# Patient Record
Sex: FEMALE | Race: WHITE | NOT HISPANIC OR LATINO | Employment: UNEMPLOYED | URBAN - METROPOLITAN AREA
[De-identification: names, ages, dates, MRNs, and addresses within clinical notes are randomized per-mention and may not be internally consistent; named-entity substitution may affect disease eponyms.]

---

## 2020-01-23 PROBLEM — N87.0 CIN I (CERVICAL INTRAEPITHELIAL NEOPLASIA I): Status: ACTIVE | Noted: 2019-04-02

## 2020-01-23 PROBLEM — Z97.5 IUD (INTRAUTERINE DEVICE) IN PLACE: Status: ACTIVE | Noted: 2019-06-25

## 2020-01-23 PROBLEM — R10.2 PELVIC PAIN IN FEMALE: Status: ACTIVE | Noted: 2019-06-25

## 2020-01-23 PROBLEM — R87.613 HSIL ON PAP SMEAR OF CERVIX: Status: ACTIVE | Noted: 2018-04-16

## 2020-01-23 PROBLEM — Z98.890 STATUS POST CONIZATION OF CERVIX: Status: ACTIVE | Noted: 2018-06-22

## 2020-01-23 PROBLEM — N93.9 ABNORMAL UTERINE BLEEDING: Status: ACTIVE | Noted: 2018-04-10

## 2020-01-23 PROBLEM — R59.0 ANTERIOR CERVICAL ADENOPATHY: Status: ACTIVE | Noted: 2020-01-23

## 2020-03-10 PROBLEM — R50.9 FEVER: Status: ACTIVE | Noted: 2020-03-10

## 2020-03-10 PROBLEM — N87.9 CERVICAL INTRAEPITHELIAL NEOPLASIA (CIN): Status: ACTIVE | Noted: 2019-04-02

## 2020-03-10 PROBLEM — D61.818 PANCYTOPENIA WITH FEVER: Status: ACTIVE | Noted: 2020-03-10

## 2020-03-10 PROBLEM — R50.81 PANCYTOPENIA WITH FEVER: Status: ACTIVE | Noted: 2020-03-10

## 2020-03-11 PROBLEM — E87.6 HYPOKALEMIA: Status: ACTIVE | Noted: 2020-03-11

## 2020-03-12 PROBLEM — K82.8 SLUDGE IN GALLBLADDER: Status: ACTIVE | Noted: 2020-03-12

## 2020-04-22 PROBLEM — R87.810 ASCUS WITH POSITIVE HIGH RISK HPV CERVICAL: Status: ACTIVE | Noted: 2020-04-22

## 2020-04-22 PROBLEM — R87.610 ASCUS WITH POSITIVE HIGH RISK HPV CERVICAL: Status: ACTIVE | Noted: 2020-04-22

## 2020-09-26 PROBLEM — J18.9 BILATERAL PNEUMONIA: Status: ACTIVE | Noted: 2020-09-26

## 2020-10-23 PROBLEM — R10.2 PELVIC PAIN IN FEMALE: Status: RESOLVED | Noted: 2019-06-25 | Resolved: 2020-10-23

## 2020-10-23 PROBLEM — N93.9 ABNORMAL UTERINE BLEEDING: Status: RESOLVED | Noted: 2018-04-10 | Resolved: 2020-10-23

## 2020-10-23 PROBLEM — J18.9 BILATERAL PNEUMONIA: Status: RESOLVED | Noted: 2020-09-26 | Resolved: 2020-10-23

## 2021-05-12 ENCOUNTER — PATIENT MESSAGE (OUTPATIENT)
Dept: RESEARCH | Facility: HOSPITAL | Age: 36
End: 2021-05-12

## 2022-04-08 PROBLEM — R07.89 ATYPICAL CHEST PAIN: Status: ACTIVE | Noted: 2022-04-08

## 2022-04-08 PROBLEM — Z71.6 ENCOUNTER FOR SMOKING CESSATION COUNSELING: Status: ACTIVE | Noted: 2022-04-08

## 2022-04-08 PROBLEM — I10 PRIMARY HYPERTENSION: Status: ACTIVE | Noted: 2022-04-08

## 2023-08-23 ENCOUNTER — HOSPITAL ENCOUNTER (OUTPATIENT)
Dept: TELEMEDICINE | Facility: HOSPITAL | Age: 38
Discharge: HOME | End: 2023-08-23
Payer: MEDICAID

## 2023-08-23 PROCEDURE — 99203 PR OFFICE/OUTPT VISIT, NEW, LEVL III, 30-44 MIN: ICD-10-PCS | Mod: 95,,, | Performed by: PSYCHIATRY & NEUROLOGY

## 2023-08-23 PROCEDURE — 99203 OFFICE O/P NEW LOW 30 MIN: CPT | Mod: 95,,, | Performed by: PSYCHIATRY & NEUROLOGY

## 2023-08-23 NOTE — SUBJECTIVE & OBJECTIVE
Woke up with symptoms?: no    Recent bleeding noted: no  Does the patient take any Blood Thinners? no  Medications: Antiplatelets:  no      Past Medical History: hypertension and HIV    Past Surgical History: no major surgeries within the last 2 weeks    Family History: no relevant history    Social History: unable to obtain    Allergies: Amoxil [Amoxicillin]     Review of Systems   Unable to perform ROS: Mental status change     Objective:   Vitals: There were no vitals taken for this visit.     CT READ: No, pending    Physical Exam  Vitals and nursing note reviewed.   Constitutional:       General: She is not in acute distress.     Appearance: Normal appearance. She is not diaphoretic.   HENT:      Head: Normocephalic and atraumatic.      Nose: Nose normal.   Eyes:      Extraocular Movements: Extraocular movements intact.   Cardiovascular:      Rate and Rhythm: Normal rate and regular rhythm.   Pulmonary:      Effort: No respiratory distress.   Abdominal:      General: There is no distension.   Genitourinary:     Comments: na  Musculoskeletal:      Cervical back: Normal range of motion.      Right lower leg: No edema.      Left lower leg: No edema.   Skin:     Findings: No erythema or rash.   Neurological:      Mental Status: She is alert. She is disoriented.      Cranial Nerves: No cranial nerve deficit.      Sensory: No sensory deficit.      Motor: No weakness.      Coordination: Coordination normal.   Psychiatric:         Mood and Affect: Mood normal.         Behavior: Behavior normal.

## 2023-08-23 NOTE — CONSULTS
[facility]  Vascular Neurology  Comprehensive Stroke Center  TeleVascular Neurology Acute Consultation Note      Consults    Consulting Provider: ZACHARIAH CALLAWAY.  Current Providers  No providers found    Patient Location: [facility] - [facility] Department  Spoke hospital nurse at bedside with patient assisting consultant.     Patient information was obtained from patient, past medical records, and primary team.         Assessment/Plan:   38 y/o  with HTN, HIV, brought in due to generalized weakness and intermittent change in mental status that started couple of weeks ago and then developed a transient episode of inability to talk in the ED that lasted 20-30 min and resolved.  Currently mildly confused without focal deficits.  NCCT head completed but unavailable for review.  Unclear etiology but low index of suspicion for an acute neurovascular insult.  Awaiting for CT head to be uploaded to the system, but if CT head negative will recommend MRI/MRA brain, serologies, UA, UDS.    Diagnoses: altered mental status. Generalized weakness. Transient language impairment.   No new Assessment & Plan notes have been filed under this hospital service since the last note was generated.  Service: Vascular Neurology      STROKE DOCUMENTATION     Acute Stroke Times:   Acute Stroke Times   Unknown Normal Date: Unknown Date  Unknown Normal Time: Unknown Time  Unknown Symptom Onset Date: Unknown Date  Unknown Symptom Onset Time: Unknown Time  Stroke Team Called Date: 08/23/23  Stroke Team Called Time: 0600  Stroke Team Arrival Date: 08/23/23  Stroke Team Arrival Time: 0610  CT Interpretation Time:  (Pending)  Thrombolytic Therapy Recommended: No  Thrombectomy Recommended: No    NIH Scale:  1a. Level of Consciousness: 0-->Alert, keenly responsive  1b. LOC Questions: 1-->Answers one question correctly  1c. LOC Commands: 0-->Performs both tasks  correctly  2. Best Gaze: 0-->Normal  3. Visual: 0-->No visual loss  4. Facial Palsy: 0-->Normal symmetrical movements  5a. Motor Arm, Left: 0-->No drift, limb holds 90 (or 45) degrees for full 10 secs  5b. Motor Arm, Right: 0-->No drift, limb holds 90 (or 45) degrees for full 10 secs  6a. Motor Leg, Left: 0-->No drift, leg holds 30 degree position for full 5 secs  6b. Motor Leg, Right: 0-->No drift, leg holds 30 degree position for full 5 secs  7. Limb Ataxia: 0-->Absent  8. Sensory: 0-->Normal, no sensory loss  9. Best Language: 0-->No aphasia, normal  10. Dysarthria: 0-->Normal  11. Extinction and Inattention (formerly Neglect): 0-->No abnormality  Total (NIH Stroke Scale): 1     [place] Score: 0  [place] Coma Scale:14   ABCD2 Score:    [id number] Score:   HAS -BLED Score:   ICH Score:   Hunt & Johnson Classification:       There were no vitals taken for this visit.  Eligible for thrombolytic therapy?: No  Thrombolytic therapy recomended: Thrombolytic therapy not recommended due to Outside of treatment window   Possible Interventional Revascularization Candidate? No; at this time symptoms not suggestive of large vessel occlusion    Disposition Recommendation: pending further studies    Subjective:     History of Present Illness: 38 y/o  with HTN, HIV, brought in due to generalized weakness and intermittent change in mental status that started couple of weeks ago and then developed a transient episode of inability to talk in the ED that lasted 20-30 min and resolved.      No notes on file      Woke up with symptoms?: no    Recent bleeding noted: no  Does the patient take any Blood Thinners? no  Medications: Antiplatelets:  no      Past Medical History: hypertension and HIV    Past Surgical History: no major surgeries within the last 2 weeks    Family History: no relevant history    Social History: unable to obtain    Allergies: Amoxil [Amoxicillin]     Review of Systems   Unable to perform ROS: Mental status  change     Objective:   Vitals: There were no vitals taken for this visit.     CT READ: No, pending    Physical Exam  Vitals and nursing note reviewed.   Constitutional:       General: She is not in acute distress.     Appearance: Normal appearance. She is not diaphoretic.   HENT:      Head: Normocephalic and atraumatic.      Nose: Nose normal.   Eyes:      Extraocular Movements: Extraocular movements intact.   Cardiovascular:      Rate and Rhythm: Normal rate and regular rhythm.   Pulmonary:      Effort: No respiratory distress.   Abdominal:      General: There is no distension.   Genitourinary:     Comments: na  Musculoskeletal:      Cervical back: Normal range of motion.      Right lower leg: No edema.      Left lower leg: No edema.   Skin:     Findings: No erythema or rash.   Neurological:      Mental Status: She is alert. She is disoriented.      Cranial Nerves: No cranial nerve deficit.      Sensory: No sensory deficit.      Motor: No weakness.      Coordination: Coordination normal.   Psychiatric:         Mood and Affect: Mood normal.         Behavior: Behavior normal.             Recommended the emergency room physician to have a brief discussion with the patient and/or family if available regarding the  risks and benefits of treatment, and to briefly document the occurrence of that discussion in his clinical encounter note.     The attending portion of this evaluation, treatment, and documentation was performed per Jony Webster MD via audiovisual.    Billing code:  (non-intervention mild to moderate stroke, TIA, some mimics)      This patient has a critical neurological condition/illness, with some potential for high morbidity and mortality.  There is a moderate probability for acute neurological change leading to clinical and possibly life-threatening deterioration requiring highest level of physician preparedness for urgent intervention.  Care was coordinated with other physicians involved in  the patient's care.  Radiologic studies and laboratory data were reviewed and interpreted, and plan of care was re-assessed based on the results.  Diagnosis, treatment options and prognosis may have been discussed with the patient and/or family members or caregiver.    In your opinion, this was a: Tier 2 Van Negative    Consult End Time: 6:25 AM     Jony Webster MD  [facility]  Vascular Neurology   [facility]